# Patient Record
Sex: MALE | Race: OTHER | NOT HISPANIC OR LATINO | Employment: FULL TIME | URBAN - METROPOLITAN AREA
[De-identification: names, ages, dates, MRNs, and addresses within clinical notes are randomized per-mention and may not be internally consistent; named-entity substitution may affect disease eponyms.]

---

## 2017-08-23 ENCOUNTER — OFFICE VISIT (OUTPATIENT)
Dept: INTERNAL MEDICINE | Facility: MEDICAL CENTER | Age: 62
End: 2017-08-23
Payer: COMMERCIAL

## 2017-08-23 VITALS
BODY MASS INDEX: 30.28 KG/M2 | HEART RATE: 70 BPM | TEMPERATURE: 97.6 F | DIASTOLIC BLOOD PRESSURE: 84 MMHG | HEIGHT: 66 IN | WEIGHT: 188.4 LBS | SYSTOLIC BLOOD PRESSURE: 120 MMHG | OXYGEN SATURATION: 97 % | RESPIRATION RATE: 19 BRPM

## 2017-08-23 DIAGNOSIS — R00.2 INTERMITTENT PALPITATIONS: ICD-10-CM

## 2017-08-23 DIAGNOSIS — I10 ESSENTIAL HYPERTENSION: ICD-10-CM

## 2017-08-23 DIAGNOSIS — Z23 NEED FOR TDAP VACCINATION: ICD-10-CM
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
## 2017-08-23 DIAGNOSIS — E66.9 OBESITY (BMI 30-39.9): ICD-10-CM

## 2017-08-23 DIAGNOSIS — Z12.11 ENCOUNTER FOR SCREENING COLONOSCOPY: ICD-10-CM

## 2017-08-23 PROCEDURE — 99204 OFFICE O/P NEW MOD 45 MIN: CPT | Mod: GC,25 | Performed by: INTERNAL MEDICINE

## 2017-08-23 PROCEDURE — 90715 TDAP VACCINE 7 YRS/> IM: CPT | Performed by: INTERNAL MEDICINE

## 2017-08-23 PROCEDURE — 90471 IMMUNIZATION ADMIN: CPT | Performed by: INTERNAL MEDICINE

## 2017-08-23 RX ORDER — AMLODIPINE BESYLATE 10 MG/1
10 TABLET ORAL DAILY
Qty: 30 TAB | Refills: 3 | Status: SHIPPED | OUTPATIENT
Start: 2017-08-23 | End: 2017-08-23 | Stop reason: SDUPTHER

## 2017-08-23 RX ORDER — SULFAMETHOXAZOLE AND TRIMETHOPRIM 800; 160 MG/1; MG/1
1 TABLET ORAL 2 TIMES DAILY
Qty: 14 TAB | Refills: 0 | Status: SHIPPED | OUTPATIENT
Start: 2017-08-23 | End: 2017-08-30

## 2017-08-23 RX ORDER — IBUPROFEN 200 MG
200 TABLET ORAL EVERY 6 HOURS PRN
COMMUNITY
End: 2018-02-14

## 2017-08-23 RX ORDER — AMOXICILLIN 500 MG/1
500 CAPSULE ORAL 3 TIMES DAILY
COMMUNITY
End: 2017-08-24

## 2017-08-23 RX ORDER — AMLODIPINE BESYLATE 10 MG/1
5 TABLET ORAL DAILY
Qty: 90 TAB | Refills: 0 | Status: SHIPPED | OUTPATIENT
Start: 2017-08-23 | End: 2017-08-23 | Stop reason: SDUPTHER

## 2017-08-23 RX ORDER — AMLODIPINE BESYLATE 10 MG/1
5 TABLET ORAL DAILY
Qty: 90 TAB | Refills: 0 | Status: SHIPPED | OUTPATIENT
Start: 2017-08-23 | End: 2017-10-26 | Stop reason: SDUPTHER

## 2017-08-23 RX ORDER — TRAMADOL HYDROCHLORIDE 50 MG/1
50 TABLET ORAL EVERY 4 HOURS PRN
Qty: 30 TAB | Refills: 0 | Status: SHIPPED | OUTPATIENT
Start: 2017-08-23 | End: 2017-08-28

## 2017-08-23 RX ORDER — IBUPROFEN 600 MG/1
600 TABLET ORAL EVERY 6 HOURS PRN
Qty: 30 TAB | Refills: 0 | Status: SHIPPED | OUTPATIENT
Start: 2017-08-23 | End: 2018-02-14

## 2017-08-23 RX ORDER — AMLODIPINE BESYLATE 10 MG/1
5 TABLET ORAL DAILY
COMMUNITY
End: 2017-08-23

## 2017-08-23 ASSESSMENT — PAIN SCALES - GENERAL: PAINLEVEL: 4=SLIGHT-MODERATE PAIN

## 2017-08-23 ASSESSMENT — PATIENT HEALTH QUESTIONNAIRE - PHQ9: CLINICAL INTERPRETATION OF PHQ2 SCORE: 0

## 2017-08-23 NOTE — PATIENT INSTRUCTIONS
Patient advised to take ABx and pain medications as instructed.     Patient advised to have labs drawn and return in 5 weeks.

## 2017-08-23 NOTE — PROGRESS NOTES
New Patient to Establish    Reason to establish: New patient to establish    CC: Dog bite, establish care    HPI: Mr. Alfonzo Iniguez is a pleasant 62-year-old male who presents to the clinic today due to dog bites he received yesterday morning. He also states that she wishes to establish care with a primary care physician.    The patient states that yesterday morning he tried to intervene between 2 dogs fighting, during the course of the altercation the patient received several scratches to his left orbit his forehead his left arm and right thumb. Patient states that both dogs were up-to-date on the rabies vaccines. Upon taking his dog to the , he was told by the , that most dogs carry staph aureus. The patient had amoxicillin from a previous dental procedure so he began to take some. Patient has also been taking ibuprofen for the pain, but states that he still hurts, 5 out of 10.     He is currently seen a cardiologist for some palpitations he had some time ago. Patient underwent stress test and echo results are negative. Patient was placed on amlodipine, since then he states his palpitations have decreased in frequency.    Aside from these medical issues patient states that he has been very healthy and has no other complaints at the moment.    Patient Active Problem List    Diagnosis Date Noted   • Obesity (BMI 30-39.9) 08/23/2017   • HTN (hypertension) 01/19/2012       Past Medical History   Diagnosis Date   • Hypertension    • Bone spur of foot        Current Outpatient Prescriptions   Medication Sig Dispense Refill   • amlodipine (NORVASC) 10 MG Tab Take 10 mg by mouth every day.     • amoxicillin (AMOXIL) 500 MG Cap Take 500 mg by mouth 3 times a day.     • ibuprofen (MOTRIN) 200 MG Tab Take 200 mg by mouth every 6 hours as needed.     • amlodipine (NORVASC) 10 MG Tab Take 1 Tab by mouth every day. 30 Tab 3   • sulfamethoxazole-trimethoprim (BACTRIM DS) 800-160 MG tablet Take 1 Tab by  "mouth 2 times a day for 7 days. 14 Tab 0   • tramadol (ULTRAM) 50 MG Tab Take 1 Tab by mouth every four hours as needed for Moderate Pain for up to 5 days. 30 Tab 0   • ibuprofen (MOTRIN) 600 MG Tab Take 1 Tab by mouth every 6 hours as needed for Inflammation. 30 Tab 0     No current facility-administered medications for this visit.       Allergies as of 08/23/2017   • (No Known Allergies)       Social History     Social History   • Marital Status:      Spouse Name: N/A   • Number of Children: N/A   • Years of Education: N/A     Occupational History   • Not on file.     Social History Main Topics   • Smoking status: Former Smoker -- 1.50 packs/day for 12 years     Types: Cigarettes     Quit date: 04/22/1985   • Smokeless tobacco: Never Used   • Alcohol Use: No   • Drug Use: No   • Sexual Activity:     Partners: Female     Birth Control/ Protection: Post-Menopausal     Other Topics Concern   • Not on file     Social History Narrative       Family History   Problem Relation Age of Onset   • Alcohol/Drug Father    • Heart Disease Father    • Diabetes Father    • Arthritis Father    • Hypertension Sister    • Hypertension Mother    • Cancer Mother    • Arthritis Mother        Past Surgical History   Procedure Laterality Date   • Bone spur excision Left        ROS: As per HPI. Additional pertinent symptoms as noted below. Review of 14 organ and body system was negative, as per patient.     All others negative    /84 mmHg  Pulse 70  Temp(Src) 36.4 °C (97.6 °F)  Resp 19  Ht 1.676 m (5' 6\")  Wt 85.458 kg (188 lb 6.4 oz)  BMI 30.42 kg/m2  SpO2 97%    Physical Exam  General:  Alert and oriented, No apparent distress.    Head: Patient had laceration to his forehead.     Eyes: Patients left orbit was erythematous and swollen. Pupils equal and reactive. No scleral icterus. No conjunctival injection. EOM intact.     Throat: Clear no erythema or exudates noted.    Neck: Supple. No lymphadenopathy noted. Thyroid " not enlarged.    Lungs: Clear to auscultation and percussion bilaterally.    Cardiovascular: Regular rate and rhythm. No murmurs, rubs or gallops.    Abdomen:  Benign. No rebound or guarding noted.    Extremities: Left arm had multiple contusions and lacerations, starting from his hand all the way to his shoulder. Patient's right thenar was swollen and erythematous, had lacerations to the nail bed. Patient complained of pain on examination. No clubbing, cyanosis, edema.    Skin: Clear. No rash or suspicious skin lesions noted.    Other:     Note: I have reviewed all pertinent labs and diagnostic tests associated with this visit with specific comments listed under the assessment and plan below    Assessment and Plan    1. Obesity (BMI 30-39.9)  Patient was advised on nutrition and exercise, patient exercises regularly walking his dogs.  Plan  -Patient will have CMP, lipid panel, and TSH drawn. Patient will follow up in 5 weeks.    2. Healthcare maintenance  Patient does not remember the last time he had a booster shot, he does state however that he is due for a colonoscopy. He had his zoster vaccine in 2014.   Plan  -Patient was referred to GI for colonoscopy.  -Patient was given Tdap today for health maintenance and for his dog bite.      3. Essential hypertension  Patient is currently being followed by cardiology for palpitations and hypertension.  Plan  -Patient was prescribed amlodipine 10 mg daily for hypertension.    4. Intermittent palpitations  Patient is currently being followed by cardiology for palpitations and hypertension.  Plan  -Patient was prescribed amlodipine 10 mg daily for palpitations. Patient states since he started taking amlodipine and his palpitations have reduced in duration and frequency.      5. Dog bite, initial encounter  Patient was bit by 2 dogs yesterday morning, he started taking amoxicillin prophylactically. Patient states that both dogs are up-to-date on their  vaccinations.  Plan  -Patient was instructed to have CBC drawn.  -Patient was prescribed bacitracin for prophylaxis.  -Patient was given Tdap vaccine during clinical visit.  -Patient was prescribed tramadol and ibuprofen for pain.  -Patient was instructed to have x-ray of right thenar.      Followup: Return in about 5 weeks (around 9/27/2017), or if symptoms worsen or fail to improve.    Risk Assessment (discuss potential complications a function of chronic problems): Not complicated    Complexity (discuss number of co-morbidities): Patient is overall fairly healthy has few medical issues such as hypertension and palpitations.    Signed by: Cheko Mendieta M.D.

## 2017-08-23 NOTE — MR AVS SNAPSHOT
"        Alfonzo Iniguez   2017 10:00 AM   Office Visit   MRN: [id number]    Department:  [facility]   Dept Phone:  [phone]    Description:  Male : 1955   Provider:  Cheko Mendieta M.D.           Reason for Visit     Laceration facial,hands.shoulders produce by dog      Allergies as of 2017     No Known Allergies      You were diagnosed with     Obesity (BMI 30-39.9)   [176796]       Healthcare maintenance   [425645]       Essential hypertension   [2991976]       Intermittent palpitations   [1094661]       Dog bite, initial encounter   [[id number]]         Vital Signs     Blood Pressure Pulse Temperature Respirations Height Weight    120/84 mmHg 70 36.4 °C (97.6 °F) 19 1.676 m (5' 6\") 85.458 kg (188 lb 6.4 oz)    Body Mass Index Oxygen Saturation Smoking Status             30.42 kg/m2 97% Former Smoker         Basic Information     Date Of Birth Sex Race Ethnicity Preferred Language    1955 Male Other Non- English      Problem List              ICD-10-CM Priority Class Noted - Resolved    HTN (hypertension) I10   2012 - Present    Obesity (BMI 30-39.9) E66.9   2017 - Present      Health Maintenance        Date Due Completion Dates    IMM DTaP/Tdap/Td Vaccine (1 - Tdap) 3/16/1973 ---    COLONOSCOPY 3/16/2004 ---    IMM ZOSTER VACCINE 3/16/2014 ---    IMM INFLUENZA (1) 2017 ---            Current Immunizations     Tdap Vaccine 2017 11:08 AM      Below and/or attached are the medications your provider expects you to take. Review all of your home medications and newly ordered medications with your provider and/or pharmacist. Follow medication instructions as directed by your provider and/or pharmacist. Please keep your medication list with you and share with your provider. Update the information when medications are discontinued, doses are changed, or new medications (including over-the-counter products) are added; and carry medication information at all times " in the event of emergency situations     Allergies:  No Known Allergies          Medications  Valid as of: August 23, 2017 - 11:20 AM    Generic Name Brand Name Tablet Size Instructions for use    AmLODIPine Besylate (Tab) NORVASC 10 MG Take 10 mg by mouth every day.        AmLODIPine Besylate (Tab) NORVASC 10 MG Take 1 Tab by mouth every day.        Amoxicillin (Cap) AMOXIL 500 MG Take 500 mg by mouth 3 times a day.        Ibuprofen (Tab) MOTRIN 200 MG Take 200 mg by mouth every 6 hours as needed.        Ibuprofen (Tab) MOTRIN 600 MG Take 1 Tab by mouth every 6 hours as needed for Inflammation.        Sulfamethoxazole-Trimethoprim (Tab) BACTRIM -160 MG Take 1 Tab by mouth 2 times a day for 7 days.        TraMADol HCl (Tab) ULTRAM 50 MG Take 1 Tab by mouth every four hours as needed for Moderate Pain for up to 5 days.        .                 Medicines prescribed today were sent to:     [id number] - [place], [place] - [address]    [address] [facility] [place] [address]    Phone: [phone] Fax: [phone]    Open 24 Hours?: No      Medication refill instructions:       If your prescription bottle indicates you have medication refills left, it is not necessary to call your provider’s office. Please contact your pharmacy and they will refill your medication.    If your prescription bottle indicates you do not have any refills left, you may request refills at any time through one of the following ways: The online [employer] system (except Urgent Care), by calling your provider’s office, or by asking your pharmacy to contact your provider’s office with a refill request. Medication refills are processed only during regular business hours and may not be available until the next business day. Your provider may request additional information or to have a follow-up visit with you prior to refilling your medication.   *Please Note: Medication refills are assigned a new Rx number when refilled electronically.  Your pharmacy may indicate that no refills were authorized even though a new prescription for the same medication is available at the pharmacy. Please request the medicine by name with the pharmacy before contacting your provider for a refill.        Your To Do List     Future Labs/Procedures Complete By Expires    CBC WITH DIFFERENTIAL  As directed 8/23/2018    COMP METABOLIC PANEL  As directed 8/23/2018    DX-FINGER(S) 2+ RIGHT  As directed 8/23/2018    LIPID PROFILE  As directed 8/23/2018    TSH WITH REFLEX TO FT4  As directed 8/23/2018      Referral     A referral request has been sent to our patient care coordination department. Please allow 3-5 business days for us to process this request and contact you either by phone or mail. If you do not hear from us by the 5th business day, please call us at [phone].        Instructions    Patient advised to take ABx and pain medications as instructed.     Patient advised to have labs drawn and return in 5 weeks.           [employer] Access Code: Activation code not generated  Current [employer] Status: Active

## 2017-08-24 ENCOUNTER — TELEPHONE (OUTPATIENT)
Dept: INTERNAL MEDICINE | Facility: MEDICAL CENTER | Age: 62
End: 2017-08-24

## 2017-08-24 ENCOUNTER — HOSPITAL ENCOUNTER (OUTPATIENT)
Dept: RADIOLOGY | Facility: MEDICAL CENTER | Age: 62
End: 2017-08-24
Attending: STUDENT IN AN ORGANIZED HEALTH CARE EDUCATION/TRAINING PROGRAM
Payer: COMMERCIAL
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
## 2017-08-24 PROCEDURE — 73140 X-RAY EXAM OF FINGER(S): CPT | Mod: RT

## 2017-08-24 RX ORDER — METRONIDAZOLE 500 MG/1
500 TABLET ORAL 3 TIMES DAILY
Qty: 21 TAB | Refills: 0 | Status: SHIPPED | OUTPATIENT
Start: 2017-08-24 | End: 2018-02-14

## 2017-08-25 NOTE — TELEPHONE ENCOUNTER
MAs (or resident) - Please call pt and let him know that we want to add on another antibiotic... Flagyl 500 mg tid.  Rx sent to pharmacy.  It will cover for more bugs (anaerobes)

## 2017-08-25 NOTE — TELEPHONE ENCOUNTER
Called pt and l/m. We prescribed an additional medication and sent it to his pharmacy. For him to take it TID. For additional questions and clarification to return my call. That I will also send a via [employer] message regarding this.

## 2017-08-25 NOTE — TELEPHONE ENCOUNTER
Called and left  asking him to  2nd abx.  Told him we sent a message on [employer] too.   Told him to contact us to make sure he got message and abx.

## 2017-08-29 NOTE — TELEPHONE ENCOUNTER
Called pt - left VM asking him to call back to make sure he picked up new med (Flagyl).  Called pharm.  He has not picked up med.  They will try calling pt.

## 2017-08-30 NOTE — TELEPHONE ENCOUNTER
Called pharmacy pt has not picked up rx.Tried calling pt left message to call back.Called emergency contact Leah sister asked her to let pt know antibiotic was waiting to be picked up at pharmacy explained to her why pt needs antibiotic.Per Leah will try to get a hold of pt.

## 2017-10-26 ENCOUNTER — PATIENT MESSAGE (OUTPATIENT)
Dept: INTERNAL MEDICINE | Facility: MEDICAL CENTER | Age: 62
End: 2017-10-26

## 2017-10-26 NOTE — TELEPHONE ENCOUNTER
From: Alfonzo Iniguez  To: Cheko Mendieta M.D.  Sent: 10/26/2017 9:48 AM PDT  Subject: Prescription Question    Hi - The prescription for amlodipine should be 5mg, 90 day supply as was prescribed earlier by Dr Wilson rather than 10mg. I was told a while ago by your nurse that you had contacted the pharmacy at [facility] on [address] with the corrected prescription but as of yesterday, they were unaware of the prescription change. Could you contact the pharmacy at [facility] and update the prescription information? I am in need of a refill at this time.    Thanks!    ~Don    PS - I'm 99% healed up from the dog bites. My right thumb has some scar tissue and nail damage, one other bite has scarring as well and is slowly healing.

## 2017-10-30 RX ORDER — AMLODIPINE BESYLATE 5 MG/1
5 TABLET ORAL DAILY
Qty: 90 TAB | Refills: 2 | Status: SHIPPED | OUTPATIENT
Start: 2017-10-30 | End: 2018-07-08 | Stop reason: SDUPTHER

## 2018-02-09 ENCOUNTER — OFFICE VISIT (OUTPATIENT)
Dept: INTERNAL MEDICINE | Facility: MEDICAL CENTER | Age: 63
End: 2018-02-09
Payer: COMMERCIAL

## 2018-02-09 VITALS
SYSTOLIC BLOOD PRESSURE: 120 MMHG | TEMPERATURE: 96.7 F | RESPIRATION RATE: 20 BRPM | HEART RATE: 94 BPM | OXYGEN SATURATION: 97 % | WEIGHT: 185 LBS | BODY MASS INDEX: 29.73 KG/M2 | DIASTOLIC BLOOD PRESSURE: 70 MMHG | HEIGHT: 66 IN
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
## 2018-02-09 DIAGNOSIS — L03.113 RIGHT FOREARM CELLULITIS: ICD-10-CM

## 2018-02-09 DIAGNOSIS — S51.851A DOG BITE OF FOREARM, RIGHT, INITIAL ENCOUNTER: ICD-10-CM

## 2018-02-09 DIAGNOSIS — I10 ESSENTIAL HYPERTENSION: ICD-10-CM

## 2018-02-09 PROCEDURE — 99214 OFFICE O/P EST MOD 30 MIN: CPT | Mod: GC | Performed by: INTERNAL MEDICINE

## 2018-02-09 RX ORDER — TRAMADOL HYDROCHLORIDE 50 MG/1
50 TABLET ORAL EVERY 4 HOURS PRN
Qty: 30 TAB | Refills: 0 | Status: SHIPPED | OUTPATIENT
Start: 2018-02-09 | End: 2018-02-23

## 2018-02-09 RX ORDER — AMOXICILLIN AND CLAVULANATE POTASSIUM 875; 125 MG/1; MG/1
1 TABLET, FILM COATED ORAL 2 TIMES DAILY
Qty: 20 TAB | Refills: 0 | Status: SHIPPED | OUTPATIENT
Start: 2018-02-09 | End: 2018-02-28

## 2018-02-09 ASSESSMENT — PAIN SCALES - GENERAL: PAINLEVEL: 7=MODERATE-SEVERE PAIN

## 2018-02-09 NOTE — PATIENT INSTRUCTIONS
Please go to ER if you develop fevers, limited motion in your wrist, chills, if symptoms fail to improve in the next couple of days    Animal Bite  An animal bite can result in a scratch on the skin, deep open cut, puncture of the skin, crush injury, or tearing away of the skin or a body part. Dogs are responsible for most animal bites. Children are bitten more often than adults. An animal bite can range from very mild to more serious. A small bite from your house pet is no cause for alarm. However, some animal bites can become infected or injure a bone or other tissue. You must seek medical care if:  · The skin is broken and bleeding does not slow down or stop after 15 minutes.  · The puncture is deep and difficult to clean (such as a cat bite).  · Pain, warmth, redness, or pus develops around the wound.  · The bite is from a stray animal or rodent. There may be a risk of rabies infection.  · The bite is from a snake, raccoon, skunk, [place], coyote, or bat. There may be a risk of rabies infection.  · The person bitten has a chronic illness such as diabetes, liver disease, or cancer, or the person takes medicine that lowers the immune system.  · There is concern about the location and severity of the bite.  It is important to clean and protect an animal bite wound right away to prevent infection. Follow these steps:  · Clean the wound with plenty of water and soap.  · Apply an antibiotic cream.  · Apply gentle pressure over the wound with a clean towel or gauze to slow or stop bleeding.  · Elevate the affected area above the heart to help stop any bleeding.  · Seek medical care. Getting medical care within 8 hours of the animal bite leads to the best possible outcome.  DIAGNOSIS   Your caregiver will most likely:  · Take a detailed history of the animal and the bite injury.  · Perform a wound exam.  · Take your medical history.  Blood tests or X-rays may be performed. Sometimes, infected bite wounds are cultured and  sent to a lab to identify the infectious bacteria.   TREATMENT   Medical treatment will depend on the location and type of animal bite as well as the patient's medical history. Treatment may include:  · Wound care, such as cleaning and flushing the wound with saline solution, bandaging, and elevating the affected area.  · Antibiotics.  · Tetanus immunization.  · Rabies immunization.  · Leaving the wound open to heal. This is often done with animal bites, due to the high risk of infection. However, in certain cases, wound closure with stitches, wound adhesive, skin adhesive strips, or staples may be used.   Infected bites that are left untreated may require intravenous (IV) antibiotics and surgical treatment in the hospital.  HOME CARE INSTRUCTIONS  · Follow your caregiver's instructions for wound care.  · Take all medicines as directed.  · If your caregiver prescribes antibiotics, take them as directed. Finish them even if you start to feel better.  · Follow up with your caregiver for further exams or immunizations as directed.  You may need a tetanus shot if:  · You cannot remember when you had your last tetanus shot.  · You have never had a tetanus shot.  · The injury broke your skin.  If you get a tetanus shot, your arm may swell, get red, and feel warm to the touch. This is common and not a problem. If you need a tetanus shot and you choose not to have one, there is a rare chance of getting tetanus. Sickness from tetanus can be serious.  SEEK MEDICAL CARE IF:  · You notice warmth, redness, soreness, swelling, pus discharge, or a bad smell coming from the wound.  · You have a red line on the skin coming from the wound.  · You have a fever, chills, or a general ill feeling.  · You have nausea or vomiting.  · You have continued or worsening pain.  · You have trouble moving the injured part.  · You have other questions or concerns.  MAKE SURE YOU:  · Understand these instructions.  · Will watch your  condition.  · Will get help right away if you are not doing well or get worse.     This information is not intended to replace advice given to you by your health care provider. Make sure you discuss any questions you have with your health care provider.     Document Released: 09/04/2012 Document Revised: 03/11/2013 Document Reviewed: 09/04/2012  [employer] Interactive Patient Education ©2016 [employer]

## 2018-02-09 NOTE — PROGRESS NOTES
Established Patient    Alfonzo presents today with the following:    CC: Dog bite    HPI:     62-year-old male with past medical history significant for hypertension who presents to clinic today after sustaining a dog bite injury to his right forearm at approximately 5 AM yesterday. Patient had similar issue in August 2017, with injury sustained after trying to intervene between his dogs fighting. Patient face-to-face similar scenario yesterday and suffered multiple puncture wounds to his right forearm along with lacerations. Since the incident he is irrigated the wounds with water and also has been using Hibiclens to wash off the wounds. This morning he noticed increased redness around the area of the puncture wounds with confluent erythema and linear streaks starting to form up the arm. Has not noted any systemic symptoms or any signs of neurovascular compromise. He did note some pain with extension of his wrist and closure of his first 2 fingers but otherwise only has pain at the site of the bites. He did receive his tetanus shot back in August and his daughters are updated on their vaccines. He has no allergies to medications.        Patient Active Problem List    Diagnosis Date Noted   • Obesity (BMI 30-39.9) 08/23/2017   • HTN (hypertension) 01/19/2012       Current Outpatient Prescriptions   Medication Sig Dispense Refill   • amoxicillin-clavulanate (AUGMENTIN) 875-125 MG Tab Take 1 Tab by mouth 2 times a day. 20 Tab 0   • tramadol (ULTRAM) 50 MG Tab Take 1 Tab by mouth every four hours as needed for up to 14 days. 30 Tab 0   • amlodipine (NORVASC) 5 MG Tab Take 1 Tab by mouth every day. 90 Tab 2   • metronidazole (FLAGYL) 500 MG Tab Take 1 Tab by mouth 3 times a day. 21 Tab 0   • ibuprofen (MOTRIN) 200 MG Tab Take 200 mg by mouth every 6 hours as needed.     • ibuprofen (MOTRIN) 600 MG Tab Take 1 Tab by mouth every 6 hours as needed for Inflammation. 30 Tab 0     No current facility-administered medications  "for this visit.            ROS: As per HPI.      /70   Pulse 94   Temp 35.9 °C (96.7 °F)   Resp 20   Ht 1.676 m (5' 6\")   Wt 83.9 kg (185 lb)   SpO2 97%   BMI 29.86 kg/m²           Physical Exam   Constitutional: 62-year-old male who is nontoxic appearing, pleasant and cooperative, no distress   HEENT: Normocephalic and atraumatic, extraocular movements intact  Musculoskeletal:  Range of motion full in his right fingers and wrist but does have reproducible pain on wrist extension,  strength preserved in right hand, no pain over bony surfaces in right upper extremity  Lymphadenopathy: There is no axillary adenopathy and right side  Neurological:  Sensation intact in all fingers, motor strength preserved  Skin: There are healing skin abrasions on the dorsal aspect of his right forearm, over the volar aspect there is in the center of forearm puncture wound which is roughly half a centimeter deep and draining blood without purulent fluid, over the same aspect of the forearm there is a confluence of erythema which blanches to palpation and extends proximally up the arm, other puncture wounds have scabs over them and no purulence could be expressed        Assessment and Plan    1. Dog bite of forearm, right, initial encounter      Right forearm cellulits  - Patient was advised to go to the ER for IV antibiotics with potential surgical/imaging evaluation as he possibly could be developing phlebitis with infection tracking up the arm versus a potentially developing tenosynovitis  if this issue is not taking care of acutely  - He politely declined ER evaluation and instead opting for trial of oral antibiotic therapy, he understands the severity of his condition and potential consequences of his condition  - Augmentin prescribed to take one tab twice a day for the next 10 days, patient to call our clinic should erythema worsen or he develops systemic signs of infection, area was marked with a sharpie so he " could evaluate any worsening of cellulitis  -Tramadol 50 mg, 30 tabs, was provided to patient due to his acute pain not controlled with Advil or Tylenol  - CONSENT FOR OPIATE PRESCRIPTION    2. Essential hypertension  - Blood pressure stable during today's visit      Follow up: Return if symptoms worsen or fail to improve.    Signed by: Darrell Espinoza M.D.

## 2018-02-12 ENCOUNTER — PATIENT MESSAGE (OUTPATIENT)
Dept: INTERNAL MEDICINE | Facility: MEDICAL CENTER | Age: 63
End: 2018-02-12

## 2018-02-12 NOTE — TELEPHONE ENCOUNTER
From: Alfonzo Iniguez  To: Darrell Espinoza M.D.  Sent: 2/12/2018 5:17 AM PST  Subject: Prescription Question    Hi -     The extent of the infection has subsided but there are two pockets which contain the infection, one is generating puss when aggressively squeezed. The Flagyl was VERY effective the last time. Please refill the Flagyl and 800mg ibuprofen.    Also, the tramadol is not an effective pain killer. I'm in a fair bit of pain due to the swelling caused by the infection.

## 2018-02-12 NOTE — TELEPHONE ENCOUNTER
Per Dr Mendieta called patient and schedule appointment for 02/14/2018 if dog bite gets worse needs to direct himself to Emergency room. Patient understood and agree.

## 2018-02-14 ENCOUNTER — OFFICE VISIT (OUTPATIENT)
Dept: INTERNAL MEDICINE | Facility: MEDICAL CENTER | Age: 63
End: 2018-02-14
Payer: COMMERCIAL

## 2018-02-14 VITALS
BODY MASS INDEX: 30.31 KG/M2 | HEIGHT: 66 IN | HEART RATE: 60 BPM | OXYGEN SATURATION: 96 % | TEMPERATURE: 97.1 F | SYSTOLIC BLOOD PRESSURE: 140 MMHG | DIASTOLIC BLOOD PRESSURE: 92 MMHG | WEIGHT: 188.6 LBS

## 2018-02-14 DIAGNOSIS — E66.9 OBESITY (BMI 30-39.9): ICD-10-CM

## 2018-02-14 DIAGNOSIS — I10 ESSENTIAL HYPERTENSION: ICD-10-CM
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
## 2018-02-14 DIAGNOSIS — S41.151D DOG BITE OF ARM, RIGHT, SUBSEQUENT ENCOUNTER: ICD-10-CM

## 2018-02-14 PROCEDURE — 99213 OFFICE O/P EST LOW 20 MIN: CPT | Mod: GE | Performed by: INTERNAL MEDICINE

## 2018-02-14 RX ORDER — IBUPROFEN 800 MG/1
800 TABLET ORAL 2 TIMES DAILY
Qty: 20 TAB | Refills: 0 | Status: SHIPPED | OUTPATIENT
Start: 2018-02-14

## 2018-02-14 RX ORDER — AMOXICILLIN AND CLAVULANATE POTASSIUM 875; 125 MG/1; MG/1
1 TABLET, FILM COATED ORAL 2 TIMES DAILY
Qty: 8 TAB | Refills: 0 | Status: SHIPPED | OUTPATIENT
Start: 2018-02-14 | End: 2018-02-28

## 2018-02-14 NOTE — PATIENT INSTRUCTIONS
Animal Bite  An animal bite can result in a scratch on the skin, deep open cut, puncture of the skin, crush injury, or tearing away of the skin or a body part. Dogs are responsible for most animal bites. Children are bitten more often than adults. An animal bite can range from very mild to more serious. A small bite from your house pet is no cause for alarm. However, some animal bites can become infected or injure a bone or other tissue. You must seek medical care if:  · The skin is broken and bleeding does not slow down or stop after 15 minutes.  · The puncture is deep and difficult to clean (such as a cat bite).  · Pain, warmth, redness, or pus develops around the wound.  · The bite is from a stray animal or rodent. There may be a risk of rabies infection.  · The bite is from a snake, raccoon, skunk, [place], coyote, or bat. There may be a risk of rabies infection.  · The person bitten has a chronic illness such as diabetes, liver disease, or cancer, or the person takes medicine that lowers the immune system.  · There is concern about the location and severity of the bite.  It is important to clean and protect an animal bite wound right away to prevent infection. Follow these steps:  · Clean the wound with plenty of water and soap.  · Apply an antibiotic cream.  · Apply gentle pressure over the wound with a clean towel or gauze to slow or stop bleeding.  · Elevate the affected area above the heart to help stop any bleeding.  · Seek medical care. Getting medical care within 8 hours of the animal bite leads to the best possible outcome.  DIAGNOSIS   Your caregiver will most likely:  · Take a detailed history of the animal and the bite injury.  · Perform a wound exam.  · Take your medical history.  Blood tests or X-rays may be performed. Sometimes, infected bite wounds are cultured and sent to a lab to identify the infectious bacteria.   TREATMENT   Medical treatment will depend on the location and type of animal bite as  well as the patient's medical history. Treatment may include:  · Wound care, such as cleaning and flushing the wound with saline solution, bandaging, and elevating the affected area.  · Antibiotics.  · Tetanus immunization.  · Rabies immunization.  · Leaving the wound open to heal. This is often done with animal bites, due to the high risk of infection. However, in certain cases, wound closure with stitches, wound adhesive, skin adhesive strips, or staples may be used.   Infected bites that are left untreated may require intravenous (IV) antibiotics and surgical treatment in the hospital.  HOME CARE INSTRUCTIONS  · Follow your caregiver's instructions for wound care.  · Take all medicines as directed.  · If your caregiver prescribes antibiotics, take them as directed. Finish them even if you start to feel better.  · Follow up with your caregiver for further exams or immunizations as directed.  You may need a tetanus shot if:  · You cannot remember when you had your last tetanus shot.  · You have never had a tetanus shot.  · The injury broke your skin.  If you get a tetanus shot, your arm may swell, get red, and feel warm to the touch. This is common and not a problem. If you need a tetanus shot and you choose not to have one, there is a rare chance of getting tetanus. Sickness from tetanus can be serious.  SEEK MEDICAL CARE IF:  · You notice warmth, redness, soreness, swelling, pus discharge, or a bad smell coming from the wound.  · You have a red line on the skin coming from the wound.  · You have a fever, chills, or a general ill feeling.  · You have nausea or vomiting.  · You have continued or worsening pain.  · You have trouble moving the injured part.  · You have other questions or concerns.  MAKE SURE YOU:  · Understand these instructions.  · Will watch your condition.  · Will get help right away if you are not doing well or get worse.     This information is not intended to replace advice given to you by your  health care provider. Make sure you discuss any questions you have with your health care provider.     Document Released: 09/04/2012 Document Revised: 03/11/2013 Document Reviewed: 09/04/2012  [employer]vier Interactive Patient Education ©2016 [employer]

## 2018-02-14 NOTE — PROGRESS NOTES
"      Established Patient    Alfozno presents today with the following:    CC: Dog bite    HPI: The following his initial encounter HPI as written by Dr. Espinoza,    \"62-year-old male with past medical history significant for hypertension who presents to clinic today after sustaining a dog bite injury to his right forearm at approximately 5 AM yesterday. Patient had similar issue in August 2017, with injury sustained after trying to intervene between his dogs fighting. Patient face-to-face similar scenario yesterday and suffered multiple puncture wounds to his right forearm along with lacerations. Since the incident he is irrigated the wounds with water and also has been using Hibiclens to wash off the wounds. This morning he noticed increased redness around the area of the puncture wounds with confluent erythema and linear streaks starting to form up the arm. Has not noted any systemic symptoms or any signs of neurovascular compromise. He did note some pain with extension of his wrist and closure of his first 2 fingers but otherwise only has pain at the site of the bites. He did receive his tetanus shot back in August and his dogs are updated on their vaccines. He has no allergies to medications.\"    Today the patient presents for further follow-up complaining that for the past several days he has had pain in his right forearm, but it is decreasing. Patient states that the redness in his forearm has reduced significantly. Patient denies fevers, chills, night sweats, or weakness.    Patient Active Problem List    Diagnosis Date Noted   • Obesity (BMI 30-39.9) 08/23/2017   • HTN (hypertension) 01/19/2012       Current Outpatient Prescriptions   Medication Sig Dispense Refill   • ibuprofen (MOTRIN) 800 MG Tab Take 1 Tab by mouth 2 Times a Day. 20 Tab 0   • amoxicillin-clavulanate (AUGMENTIN) 875-125 MG Tab Take 1 Tab by mouth 2 times a day. 8 Tab 0   • amoxicillin-clavulanate (AUGMENTIN) 875-125 MG Tab Take 1 Tab by " "mouth 2 times a day. 20 Tab 0   • tramadol (ULTRAM) 50 MG Tab Take 1 Tab by mouth every four hours as needed for up to 14 days. 30 Tab 0   • amlodipine (NORVASC) 5 MG Tab Take 1 Tab by mouth every day. 90 Tab 2     No current facility-administered medications for this visit.        ROS: As per HPI. Additional pertinent symptoms as noted below.    A complete 14 system ROS was inquired of the patient and is otherwise negative except as noted on the HPI.     /92   Pulse 60   Temp 36.2 °C (97.1 °F)   Ht 1.676 m (5' 6\")   Wt 85.5 kg (188 lb 9.6 oz)   SpO2 96%   BMI 30.44 kg/m²     Physical Exam   Constitutional: He is oriented to person, place, and time. He appears well-developed and well-nourished. No distress.   HENT:   Head: Normocephalic and atraumatic.   Right Ear: External ear normal.   Left Ear: External ear normal.   Nose: Nose normal.   Mouth/Throat: Oropharynx is clear and moist.   Eyes: EOM are normal. Pupils are equal, round, and reactive to light.   Neck: Normal range of motion. Neck supple.   Cardiovascular: Normal rate, regular rhythm and normal heart sounds.    Pulmonary/Chest: Effort normal and breath sounds normal.   Abdominal: Soft. Bowel sounds are normal.   Neurological: He is alert and oriented to person, place, and time. He displays normal reflexes. No cranial nerve deficit or sensory deficit. He exhibits normal muscle tone. Coordination normal.   Skin: Skin is warm. Abrasion and lesion noted. He is not diaphoretic. There is erythema.        Psychiatric: His speech is normal and behavior is normal. Judgment and thought content normal. His mood appears anxious. His affect is not angry, not blunt, not labile and not inappropriate. Cognition and memory are normal. He does not exhibit a depressed mood.         Note: I have reviewed all pertinent labs and diagnostic tests associated with this visit with specific comments listed under the assessment and plan below    Assessment and Plan    1. " Dog bite of arm, right, subsequent encounter  Patient presented a week ago due to a dog bite, both dogs are up to date on vaccinations. Patient was advised to go to the ED for further evaluation. Patient declined. Was given 10 day course of augmentin, patient on day 6 today.   Plan  -Patient was given 4 more days of Augmentin for a total prescription of 2 weeks.  -Patient requested more pain medication but was prescribed Tylenol 800 mg twice a day for a total of 10 days.  -Patient was advised that if he began having fevers, chills, or his infection worsen on his arm, he should proceed to the nearest emergency department.  -Patient was scheduled for 2 week appointment to examine for resolution of injuries.      2. Essential hypertension  Patient was slightly hypertensive today on this visit, patient relates it to being anxious about dog bites.  Plan  -Patient is currently on amlodipine 5 mg, patient to continue current dose    3. Obesity (BMI 30-39.9)  Patient is 188 pounds, patient was counseled on weight loss.      Followup: Return in about 2 weeks (around 2/28/2018), or if symptoms worsen or fail to improve, for Short.      Signed by: Cheko Mendieta M.D.

## 2018-02-28 ENCOUNTER — OFFICE VISIT (OUTPATIENT)
Dept: INTERNAL MEDICINE | Facility: MEDICAL CENTER | Age: 63
End: 2018-02-28
Payer: COMMERCIAL

## 2018-02-28 VITALS
HEART RATE: 61 BPM | BODY MASS INDEX: 30.73 KG/M2 | SYSTOLIC BLOOD PRESSURE: 132 MMHG | OXYGEN SATURATION: 96 % | HEIGHT: 66 IN | WEIGHT: 191.2 LBS | DIASTOLIC BLOOD PRESSURE: 77 MMHG | TEMPERATURE: 97.2 F

## 2018-02-28 DIAGNOSIS — I10 ESSENTIAL HYPERTENSION: ICD-10-CM

## 2018-02-28 DIAGNOSIS — Z12.11 SCREENING FOR COLON CANCER: ICD-10-CM

## 2018-02-28 DIAGNOSIS — Z09 FOLLOW UP: ICD-10-CM

## 2018-02-28 DIAGNOSIS — Z00.00 HEALTHCARE MAINTENANCE: ICD-10-CM

## 2018-02-28 PROCEDURE — 99213 OFFICE O/P EST LOW 20 MIN: CPT | Mod: GE | Performed by: INTERNAL MEDICINE

## 2018-02-28 ASSESSMENT — PAIN SCALES - GENERAL: PAINLEVEL: NO PAIN

## 2018-02-28 NOTE — PATIENT INSTRUCTIONS
Please give the shingles vaccine to the patient (shingrix)        Shingles  Shingles, which is also known as herpes zoster, is an infection that causes a painful skin rash and fluid-filled blisters. Shingles is not related to genital herpes, which is a sexually transmitted infection.     Shingles only develops in people who:  · Have had chickenpox.  · Have received the chickenpox vaccine. (This is rare.)  CAUSES  Shingles is caused by varicella-zoster virus (VZV). This is the same virus that causes chickenpox. After exposure to VZV, the virus stays in the body in an inactive (dormant) state. Shingles develops if the virus reactivates. This can happen many years after the initial exposure to VZV. It is not known what causes this virus to reactivate.  RISK FACTORS  People who have had chickenpox or received the chickenpox vaccine are at risk for shingles. Infection is more common in people who:  · Are older than age 50.  · Have a weakened defense (immune) system, such as those with HIV, AIDS, or cancer.  · Are taking medicines that weaken the immune system, such as transplant medicines.  · Are under great stress.  SYMPTOMS  Early symptoms of this condition include itching, tingling, and pain in an area on your skin. Pain may be described as burning, stabbing, or throbbing.  A few days or weeks after symptoms start, a painful red rash appears, usually on one side of the body in a bandlike or beltlike pattern. The rash eventually turns into fluid-filled blisters that break open, scab over, and dry up in about 2-3 weeks.  At any time during the infection, you may also develop:  · A fever.  · Chills.  · A headache.  · An upset stomach.  DIAGNOSIS  This condition is diagnosed with a skin exam. Sometimes, skin or fluid samples are taken from the blisters before a diagnosis is made. These samples are examined under a microscope or sent to a lab for testing.  TREATMENT  There is no specific cure for this condition. Your  health care provider will probably prescribe medicines to help you manage pain, recover more quickly, and avoid long-term problems. Medicines may include:  · Antiviral drugs.  · Anti-inflammatory drugs.  · Pain medicines.  If the area involved is on your face, you may be referred to a specialist, such as an eye doctor (ophthalmologist) or an ear, nose, and throat (ENT) doctor to help you avoid eye problems, chronic pain, or disability.  HOME CARE INSTRUCTIONS  Medicines  · Take medicines only as directed by your health care provider.  · Apply an anti-itch or numbing cream to the affected area as directed by your health care provider.  Blister and Rash Care  · Take a cool bath or apply cool compresses to the area of the rash or blisters as directed by your health care provider. This may help with pain and itching.  · Keep your rash covered with a loose bandage (dressing). Wear loose-fitting clothing to help ease the pain of material rubbing against the rash.  · Keep your rash and blisters clean with mild soap and cool water or as directed by your health care provider.  · Check your rash every day for signs of infection. These include redness, swelling, and pain that lasts or increases.  · Do not pick your blisters.  · Do not scratch your rash.  General Instructions  · Rest as directed by your health care provider.  · Keep all follow-up visits as directed by your health care provider. This is important.  · Until your blisters scab over, your infection can cause chickenpox in people who have never had it or been vaccinated against it. To prevent this from happening, avoid contact with other people, especially:  ¨ Babies.  ¨ Pregnant women.  ¨ Children who have eczema.  ¨ Elderly people who have transplants.  ¨ People who have chronic illnesses, such as leukemia or AIDS.  SEEK MEDICAL CARE IF:  · Your pain is not relieved with prescribed medicines.  · Your pain does not get better after the rash heals.  · Your rash  looks infected. Signs of infection include redness, swelling, and pain that lasts or increases.  SEEK IMMEDIATE MEDICAL CARE IF:  · The rash is on your face or nose.  · You have facial pain, pain around your eye area, or loss of feeling on one side of your face.  · You have ear pain or you have ringing in your ear.  · You have loss of taste.  · Your condition gets worse.     This information is not intended to replace advice given to you by your health care provider. Make sure you discuss any questions you have with your health care provider.     Document Released: 12/18/2006 Document Revised: 01/08/2016 Document Reviewed: 10/29/2015  [employer] Interactive Patient Education ©2016 [employer] Inc.      Colonoscopy  A colonoscopy is an exam to look at the entire large intestine (colon). This exam can help find problems such as tumors, polyps, inflammation, and areas of bleeding. The exam takes about 1 hour.   LET YOUR HEALTH CARE PROVIDER KNOW ABOUT:   · Any allergies you have.  · All medicines you are taking, including vitamins, herbs, eye drops, creams, and over-the-counter medicines.  · Previous problems you or members of your family have had with the use of anesthetics.  · Any blood disorders you have.  · Previous surgeries you have had.  · Medical conditions you have.  RISKS AND COMPLICATIONS   Generally, this is a safe procedure. However, as with any procedure, complications can occur. Possible complications include:  · Bleeding.  · Tearing or rupture of the colon wall.  · Reaction to medicines given during the exam.  · Infection (rare).  BEFORE THE PROCEDURE   · Ask your health care provider about changing or stopping your regular medicines.  · You may be prescribed an oral bowel prep. This involves drinking a large amount of medicated liquid, starting the day before your procedure. The liquid will cause you to have multiple loose stools until your stool is almost clear or light green. This cleans out your colon in  preparation for the procedure.  · Do not eat or drink anything else once you have started the bowel prep, unless your health care provider tells you it is safe to do so.  · Arrange for someone to drive you home after the procedure.  PROCEDURE   · You will be given medicine to help you relax (sedative).  · You will lie on your side with your knees bent.  · A long, flexible tube with a light and camera on the end (colonoscope) will be inserted through the rectum and into the colon. The camera sends video back to a computer screen as it moves through the colon. The colonoscope also releases carbon dioxide gas to inflate the colon. This helps your health care provider see the area better.  · During the exam, your health care provider may take a small tissue sample (biopsy) to be examined under a microscope if any abnormalities are found.  · The exam is finished when the entire colon has been viewed.  AFTER THE PROCEDURE   · Do not drive for 24 hours after the exam.  · You may have a small amount of blood in your stool.  · You may pass moderate amounts of gas and have mild abdominal cramping or bloating. This is caused by the gas used to inflate your colon during the exam.  · Ask when your test results will be ready and how you will get your results. Make sure you get your test results.     This information is not intended to replace advice given to you by your health care provider. Make sure you discuss any questions you have with your health care provider.     Document Released: 12/15/2001 Document Revised: 10/08/2014 Document Reviewed: 08/25/2014  [employer] Interactive Patient Education ©2016 [employer].

## 2018-03-01 NOTE — PROGRESS NOTES
"      Established Patient    Alfonzo presents today with the following:    CC: Follow up visit after completing antibiotic course.    HPI: Mr. Iniguez is a 63 y/o M with PMH of HTN. Presents to the clinic to follow up on after receiving Augmentin for two weeks.  The patient reports that his right forearm was bitten by his own dog on the 2/9/2018 when he was trying to stop a dog fight between two of his own dogs. The patient reports that both of his dogs are vaccinated properly and they are not acting aggressively and it was an accident.  The patient received a 14 days of Augmentin and it was done on the 2/25/2018. The patient reports that the wound is healing properly and he is able to use his hand with no pain.   Pt denies fever, chills, hand swelling, red skin, wound discharge, limitation of movement.     Patient Active Problem List    Diagnosis Date Noted   • Obesity (BMI 30-39.9) 08/23/2017   • HTN (hypertension) 01/19/2012       Current Outpatient Prescriptions   Medication Sig Dispense Refill   • ibuprofen (MOTRIN) 800 MG Tab Take 1 Tab by mouth 2 Times a Day. 20 Tab 0   • amlodipine (NORVASC) 5 MG Tab Take 1 Tab by mouth every day. 90 Tab 2     No current facility-administered medications for this visit.        ROS: As per HPI. Additional pertinent symptoms as noted below.    All others negative    /77   Pulse 61   Temp 36.2 °C (97.2 °F)   Ht 1.676 m (5' 6\")   Wt 86.7 kg (191 lb 3.2 oz)   SpO2 96%   BMI 30.86 kg/m²     Physical Exam   Constitutional:  oriented to person, place, and time. No distress.   Eyes: Pupils are equal, round, and reactive to light. No scleral icterus.  Neck: Neck supple. No thyromegaly present.   Cardiovascular: Normal rate, regular rhythm and normal heart sounds.  Exam reveals no gallop and no friction rub.  No murmur heard.  Pulmonary/Chest: Breath sounds normal. Chest wall is not dull to percussion.   Musculoskeletal:   no edema. Rt upper limb: intact distal pulses, no " redness, intact skin with no skin breaks, two small healing scabs, not tender, no edema, no drainage, intact range of movement in both wrist and elbow joints. Intact muscle power 5/5, intact sensation.  Lymphadenopathy: no lymphadenopathy  Neurological: alert and oriented to person, place, and time.   Skin: No cyanosis. Nails show no clubbing.  Other: Abdomin: soft not tender, no rebound, no mass, no organomegaly.    Note: I have reviewed all pertinent labs and diagnostic tests associated with this visit with specific comments listed under the assessment and plan below    Assessment and Plan    1. Follow up  - Very good wound healing (wounds were caused by a dog bite on 2/9/18), tolerated the antibiotics very well with no side effects. Currently on Ibuprofen 400 mg PRN only.  - denies fever, chills, hydrophobia, photophobia, SOB, difficulty in breathing, or muscle spasm.  - PE: please refer to physical examination.  Plan  - continue on Ibuprofen PRN (Pt received a CMP lab work order to check for KFT and electrolytes since the last Lab test was 2012)( advice the patient not to use Ibuprofen on an empty stomach and not to exceed 3 tab per day)     2. Essential hypertension  - Currently on Amlodipine 5 mg daily,   - Bp target is 120/80 mmHg the patients Bp at the clinic is 132/77 mmHg  - Denies headache, blurred vision, chest pain, SOB, or palpitations.  Plan  - Educate the patient about healthy diet and scheduled exercise.  - advice the patient to make a blood pressure diary and bring it with him on next office visit.    3. Healthcare maintenance  - Flu vaccine (Due, Pt will consider Flu vaccine on next flu season)  - Tdap (Up to date, 2017)  - Shingles vaccine (Due, will receive it from a pharmacy out side the clinic)  - pneumococcal (not indicated)  - Will consider Screening for AAA at age 65 (smoking history for 15 y)  - lung cancer screening (not indicated, quit smoking >30 y ago, no current pulmonary  pathology)    4. Screening for colon cancer  - Pt is 61 y/o who never had colonoscopy in the past  - Pt is willing to check for colon cancer and he received a referral to do colonoscopy.  - Denies family H/O colon cancer  - Denies black stool, bleeding per rectum, abdominal pain/discomfort, altered bowel habit, weight change, diarrhea/constipation, or fatigue.      Followup: Return in about 3 months (around 5/28/2018), or if symptoms worsen or fail to improve.      Signed by: Emilia Manzo M.D.

## 2018-05-30 ENCOUNTER — APPOINTMENT (OUTPATIENT)
Dept: INTERNAL MEDICINE | Facility: MEDICAL CENTER | Age: 63
End: 2018-05-30
Payer: COMMERCIAL

## 2018-07-09 NOTE — TELEPHONE ENCOUNTER
Was the patient seen in the last year in this department? Yes  Pt last seen on: 02/28/2018 by Dr. Parnell Next appt on: none      Does patient have an active prescription for medications requested? No     Received Request Via: Pharmacy

## 2018-07-11 RX ORDER — AMLODIPINE BESYLATE 5 MG/1
TABLET ORAL
Qty: 90 TAB | Refills: 1 | Status: SHIPPED | OUTPATIENT
Start: 2018-07-11 | End: 2019-01-08 | Stop reason: SDUPTHER

## 2019-01-10 RX ORDER — AMLODIPINE BESYLATE 5 MG/1
TABLET ORAL
Qty: 90 TAB | Refills: 1 | Status: SHIPPED | OUTPATIENT
Start: 2019-01-10

## 2021-03-03 DIAGNOSIS — Z23 NEED FOR VACCINATION: ICD-10-CM
